# Patient Record
Sex: FEMALE | Race: WHITE | Employment: STUDENT | URBAN - METROPOLITAN AREA
[De-identification: names, ages, dates, MRNs, and addresses within clinical notes are randomized per-mention and may not be internally consistent; named-entity substitution may affect disease eponyms.]

---
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
## 2024-01-09 ENCOUNTER — TELEPHONE (OUTPATIENT)
Dept: INTERNAL MEDICINE CLINIC | Facility: CLINIC | Age: 19
End: 2024-01-09

## 2024-02-14 ENCOUNTER — OFFICE VISIT (OUTPATIENT)
Dept: INTERNAL MEDICINE CLINIC | Facility: CLINIC | Age: 19
End: 2024-02-14
Payer: COMMERCIAL

## 2024-02-14 VITALS
HEART RATE: 86 BPM | OXYGEN SATURATION: 98 % | HEIGHT: 68.11 IN | WEIGHT: 251.81 LBS | TEMPERATURE: 97 F | SYSTOLIC BLOOD PRESSURE: 138 MMHG | BODY MASS INDEX: 38.16 KG/M2 | DIASTOLIC BLOOD PRESSURE: 88 MMHG | RESPIRATION RATE: 18 BRPM

## 2024-02-14 DIAGNOSIS — K11.20 SALIVARY GLAND INFECTION: Primary | ICD-10-CM

## 2024-02-14 PROCEDURE — 3075F SYST BP GE 130 - 139MM HG: CPT | Performed by: FAMILY MEDICINE

## 2024-02-14 PROCEDURE — 3079F DIAST BP 80-89 MM HG: CPT | Performed by: FAMILY MEDICINE

## 2024-02-14 PROCEDURE — 99213 OFFICE O/P EST LOW 20 MIN: CPT | Performed by: FAMILY MEDICINE

## 2024-02-14 PROCEDURE — 3008F BODY MASS INDEX DOCD: CPT | Performed by: FAMILY MEDICINE

## 2024-02-14 RX ORDER — HYDROXYZINE HYDROCHLORIDE 25 MG/1
25 TABLET, FILM COATED ORAL DAILY PRN
COMMUNITY

## 2024-02-14 RX ORDER — AMOXICILLIN AND CLAVULANATE POTASSIUM 875; 125 MG/1; MG/1
1 TABLET, FILM COATED ORAL 2 TIMES DAILY
Qty: 20 TABLET | Refills: 0 | Status: SHIPPED | OUTPATIENT
Start: 2024-02-14 | End: 2024-02-24

## 2024-02-14 RX ORDER — ESCITALOPRAM OXALATE 20 MG/1
20 TABLET ORAL DAILY
COMMUNITY
Start: 2024-01-02

## 2024-02-14 NOTE — PROGRESS NOTES
CHIEF COMPLAINT:     Chief Complaint   Patient presents with    Mass     States appeared  2 days ago, has hard mass which has gone down. L side        HPI:   Maddie Graves is a 18 year old female .  Pt c/o left sided facial swelling. Pt was sent home from college because they thought it was the mumps. Pt was tested and has not gotten the results yet.  Pt states the swelling has gone down some and it is a little less tender. Pt thinks it is more of a salivary gland issue.  Current Outpatient Medications   Medication Sig Dispense Refill    escitalopram 20 MG Oral Tab Take 1 tablet (20 mg total) by mouth daily.      hydrOXYzine 25 MG Oral Tab Take 1 tablet (25 mg total) by mouth daily as needed for Itching.      amoxicillin clavulanate 875-125 MG Oral Tab Take 1 tablet by mouth 2 (two) times daily for 10 days. 20 tablet 0    Norethin-Eth Estrad-Fe Biphas (LO LOESTRIN FE OR) Take by mouth.        Past Medical History:   Diagnosis Date    Allergic rhinitis due to pollen     Asthma     Mild intermittent, flares about once yearly    UTI   2010      Social History:  Social History     Socioeconomic History    Marital status: Single   Tobacco Use    Smoking status: Never    Smokeless tobacco: Never   Vaping Use    Vaping Use: Never used   Substance and Sexual Activity    Alcohol use: No    Drug use: No   Other Topics Concern    Caffeine Concern Yes     Comment: sometimes soda, very rare.    Exercise Yes     Comment: 5x/week.        REVIEW OF SYSTEMS:   GENERAL: Denies fever, chills,weight change, decreased appetite  SKIN: Denies rashes, skin wounds or ulcers.  EYES: Denies blurred vision or double vision  HENT: Denies congestion, rhinorrhea, sore throat or ear pain  CHEST: Denies chest pain, or palpitations  LUNGS: Denies shortness of breath, cough, or wheezing  GI: Denies abdominal pain, N/V/C/D.   MUSCULOSKELETAL: no arthralgia or swollen joints  LYMPH:  see HPI  NEURO: Denies headaches or lightheadedness      EXAM:    /88   Pulse 86   Temp 97.1 °F (36.2 °C) (Temporal)   Resp 18   Ht 5' 8.11\" (1.73 m)   Wt 251 lb 12.8 oz (114.2 kg)   LMP 01/17/2024   SpO2 98%   BMI 38.16 kg/m²   GENERAL: well developed, well nourished,in no apparent distress  SKIN: no rashes,no suspicious lesions  HEAD: atraumatic, normocephalic  EYES: conjunctiva clear, EOM intact, PERRLA  EARS: TM's clear, no bulging, no retraction, no fluid  NOSE: nostrils patent, no exudates, nasal mucosa pink and noninflamed  THROAT: oral mucosa pink, moist. No visible dental caries. Posterior pharynx is no erythematous. no exudates.  Swollen salivary gland on the left side only, tender to palpate.  Dr. Ashraf consulted and examined Pt.  NECK: supple, non-tender  LUNGS: clear to auscultation bilaterally, no wheezes or rhonchi. Breathing is non labored.  CARDIO: RRR without murmur    Physical Exam    ASSESSMENT AND PLAN:     Encounter Diagnosis   Name Primary?    Salivary gland infection Yes       No orders of the defined types were placed in this encounter.      Meds & Refills for this Visit:  Requested Prescriptions     Signed Prescriptions Disp Refills    amoxicillin clavulanate 875-125 MG Oral Tab 20 tablet 0     Sig: Take 1 tablet by mouth 2 (two) times daily for 10 days.       Imaging & Consults:  None    PLAN:  - Start antibiotic, take with food  - tylenol/motrin as needed for discomfort  - massage the gland  - suck on emily or lemon drops to increase saliva flow  - drink plenty of fluids  - moist heat to help swelling and pain  The patient indicates understanding of these issues and agrees to the plan.  The patient is asked to call if not improving. Pt will also call with mumps result the college took.

## 2024-06-08 ENCOUNTER — LAB ENCOUNTER (OUTPATIENT)
Dept: LAB | Age: 19
End: 2024-06-08
Attending: FAMILY MEDICINE
Payer: COMMERCIAL

## 2024-06-08 ENCOUNTER — OFFICE VISIT (OUTPATIENT)
Dept: INTERNAL MEDICINE CLINIC | Facility: CLINIC | Age: 19
End: 2024-06-08
Payer: COMMERCIAL

## 2024-06-08 VITALS
OXYGEN SATURATION: 97 % | TEMPERATURE: 98 F | WEIGHT: 250 LBS | SYSTOLIC BLOOD PRESSURE: 115 MMHG | DIASTOLIC BLOOD PRESSURE: 78 MMHG | BODY MASS INDEX: 37.89 KG/M2 | HEART RATE: 92 BPM | HEIGHT: 68.11 IN

## 2024-06-08 DIAGNOSIS — R10.30 LOWER ABDOMINAL PAIN: ICD-10-CM

## 2024-06-08 DIAGNOSIS — N92.6 IRREGULAR MENSES: ICD-10-CM

## 2024-06-08 DIAGNOSIS — N92.6 IRREGULAR MENSES: Primary | ICD-10-CM

## 2024-06-08 LAB
ALBUMIN SERPL-MCNC: 4.4 G/DL (ref 3.2–4.8)
ALBUMIN/GLOB SERPL: 1.3 {RATIO} (ref 1–2)
ALP LIVER SERPL-CCNC: 58 U/L
ALT SERPL-CCNC: 35 U/L
ANION GAP SERPL CALC-SCNC: 8 MMOL/L (ref 0–18)
AST SERPL-CCNC: 26 U/L (ref ?–34)
BASOPHILS # BLD AUTO: 0.08 X10(3) UL (ref 0–0.2)
BASOPHILS NFR BLD AUTO: 0.9 %
BILIRUB SERPL-MCNC: 0.8 MG/DL (ref 0.3–1.2)
BUN BLD-MCNC: 10 MG/DL (ref 9–23)
BUN/CREAT SERPL: 13.2 (ref 10–20)
CALCIUM BLD-MCNC: 9.3 MG/DL (ref 8.7–10.4)
CHLORIDE SERPL-SCNC: 107 MMOL/L (ref 98–112)
CO2 SERPL-SCNC: 25 MMOL/L (ref 21–32)
CREAT BLD-MCNC: 0.76 MG/DL
DEPRECATED RDW RBC AUTO: 38.9 FL (ref 35.1–46.3)
DHEA-S SERPL-MCNC: 127.8 UG/DL
EGFRCR SERPLBLD CKD-EPI 2021: 115 ML/MIN/1.73M2 (ref 60–?)
EOSINOPHIL # BLD AUTO: 0.57 X10(3) UL (ref 0–0.7)
EOSINOPHIL NFR BLD AUTO: 6.3 %
ERYTHROCYTE [DISTWIDTH] IN BLOOD BY AUTOMATED COUNT: 11.8 % (ref 11–15)
FASTING STATUS PATIENT QL REPORTED: YES
FSH SERPL-ACNC: 5.4 MIU/ML
GLOBULIN PLAS-MCNC: 3.4 G/DL (ref 2–3.5)
GLUCOSE BLD-MCNC: 80 MG/DL (ref 70–99)
HCT VFR BLD AUTO: 41.5 %
HGB BLD-MCNC: 14.2 G/DL
IMM GRANULOCYTES # BLD AUTO: 0.03 X10(3) UL (ref 0–1)
IMM GRANULOCYTES NFR BLD: 0.3 %
LH SERPL-ACNC: 2.3 MIU/ML
LYMPHOCYTES # BLD AUTO: 2.12 X10(3) UL (ref 1–4)
LYMPHOCYTES NFR BLD AUTO: 23.3 %
MCH RBC QN AUTO: 31.1 PG (ref 26–34)
MCHC RBC AUTO-ENTMCNC: 34.2 G/DL (ref 31–37)
MCV RBC AUTO: 90.8 FL
MONOCYTES # BLD AUTO: 0.83 X10(3) UL (ref 0.1–1)
MONOCYTES NFR BLD AUTO: 9.1 %
NEUTROPHILS # BLD AUTO: 5.48 X10 (3) UL (ref 1.5–7.7)
NEUTROPHILS # BLD AUTO: 5.48 X10(3) UL (ref 1.5–7.7)
NEUTROPHILS NFR BLD AUTO: 60.1 %
OSMOLALITY SERPL CALC.SUM OF ELEC: 288 MOSM/KG (ref 275–295)
PLATELET # BLD AUTO: 379 10(3)UL (ref 150–450)
POTASSIUM SERPL-SCNC: 4.2 MMOL/L (ref 3.5–5.1)
PROT SERPL-MCNC: 7.8 G/DL (ref 5.7–8.2)
RBC # BLD AUTO: 4.57 X10(6)UL
SODIUM SERPL-SCNC: 140 MMOL/L (ref 136–145)
TSI SER-ACNC: 1.81 MIU/ML (ref 0.55–4.78)
WBC # BLD AUTO: 9.1 X10(3) UL (ref 4–11)

## 2024-06-08 PROCEDURE — 83002 ASSAY OF GONADOTROPIN (LH): CPT | Performed by: FAMILY MEDICINE

## 2024-06-08 PROCEDURE — 3078F DIAST BP <80 MM HG: CPT | Performed by: FAMILY MEDICINE

## 2024-06-08 PROCEDURE — 3074F SYST BP LT 130 MM HG: CPT | Performed by: FAMILY MEDICINE

## 2024-06-08 PROCEDURE — 82627 DEHYDROEPIANDROSTERONE: CPT | Performed by: FAMILY MEDICINE

## 2024-06-08 PROCEDURE — 80050 GENERAL HEALTH PANEL: CPT | Performed by: FAMILY MEDICINE

## 2024-06-08 PROCEDURE — 84143 ASSAY OF 17-HYDROXYPREGNENO: CPT | Performed by: FAMILY MEDICINE

## 2024-06-08 PROCEDURE — 3008F BODY MASS INDEX DOCD: CPT | Performed by: FAMILY MEDICINE

## 2024-06-08 PROCEDURE — 83001 ASSAY OF GONADOTROPIN (FSH): CPT | Performed by: FAMILY MEDICINE

## 2024-06-08 PROCEDURE — 99214 OFFICE O/P EST MOD 30 MIN: CPT | Performed by: FAMILY MEDICINE

## 2024-06-08 RX ORDER — NORETHINDRONE ACETATE AND ETHINYL ESTRADIOL, AND FERROUS FUMARATE 1MG-20(24)
KIT ORAL
COMMUNITY
Start: 2024-05-10

## 2024-06-08 RX ORDER — BETAMETHASONE DIPROPIONATE 0.5 MG/G
CREAM TOPICAL
COMMUNITY
Start: 2024-04-12

## 2024-06-08 NOTE — PROGRESS NOTES
Maddie Graves is a 18 year old female.  HPI:   For many yrs has had issues w irregular menses    this has gotten worse     has random vomiting now , not assoc w cycles   this has been since Dec , went away and then came back in April or May      has had lower abd pain off on as well     not necessarily w cycles        Few months ago had 1 month of nausea    resolved       has had issues w sleep off on as well      Has internal heat sensation in her body         Bms are daily to every other day    no blood or dark stools   Urine can have some urges     Sees gyne for OCP     appettie can fluctuate at times     eating does not cause issues   At present feels fine       No unusual HAs       Current Outpatient Medications   Medication Sig Dispense Refill    betamethasone dipropionate 0.05 % External Cream       escitalopram 20 MG Oral Tab Take 1 tablet (20 mg total) by mouth daily.      hydrOXYzine 25 MG Oral Tab Take 1 tablet (25 mg total) by mouth daily as needed for Itching.      Norethin-Eth Estrad-Fe Biphas (LO LOESTRIN FE OR) Take by mouth.      Norethin Ace-Eth Estrad-FE 1-20 MG-MCG(24) Oral Cap  (Patient not taking: Reported on 6/8/2024)        Past Medical History:    Allergic rhinitis due to pollen    Asthma    Mild intermittent, flares about once yearly    UTI        Social History:  Social History     Socioeconomic History    Marital status: Single   Tobacco Use    Smoking status: Never    Smokeless tobacco: Never   Vaping Use    Vaping status: Never Used   Substance and Sexual Activity    Alcohol use: No    Drug use: No   Other Topics Concern    Caffeine Concern Yes     Comment: sometimes soda, very rare.    Exercise Yes     Comment: 5x/week.        REVIEW OF SYSTEMS:   GENERAL HEALTH: feels well otherwise  SKIN: denies rashes  RESPIRATORY: denies shortness of breath  CARDIOVASCULAR: denies chest pain   GI: denies abdominal pain  NEURO: denies headaches    EXAM:   /78   Pulse 92   Temp 98 °F  (36.7 °C) (Temporal)   Ht 5' 8.11\" (1.73 m)   Wt 250 lb (113.4 kg)   LMP 06/01/2024   SpO2 97%   BMI 37.89 kg/m²   GENERAL: well developed, well nourished,in no apparent distress  SKIN: no rashes  NECK: supple,no adenopathy  LUNGS: clear to auscultation  CARDIO: RRR without murmur  ABD:  soft NT  no mass or HSM    EXTREMITIES: no edema    ASSESSMENT AND PLAN:   Irregular menses/lower abd pain/nausea :  Glad she feels better    Has appt w gyne in aug and states they will order US then      discussed her s/s    the nausea is better    BMs are fine     Alreaady on OCP    will check labs, US pelvis   Proceed then    The patient indicates understanding of these issues and agrees to the plan.  .

## 2024-06-13 LAB — 17-OH PROGESTERONE: 24 NG/DL

## 2024-11-21 ENCOUNTER — OFFICE VISIT (OUTPATIENT)
Dept: INTERNAL MEDICINE CLINIC | Facility: CLINIC | Age: 19
End: 2024-11-21
Payer: COMMERCIAL

## 2024-11-21 VITALS
HEART RATE: 88 BPM | DIASTOLIC BLOOD PRESSURE: 88 MMHG | OXYGEN SATURATION: 96 % | TEMPERATURE: 97 F | WEIGHT: 253.63 LBS | RESPIRATION RATE: 18 BRPM | SYSTOLIC BLOOD PRESSURE: 132 MMHG | BODY MASS INDEX: 38.44 KG/M2 | HEIGHT: 68.11 IN

## 2024-11-21 DIAGNOSIS — R42 DIZZINESS: Primary | ICD-10-CM

## 2024-11-21 DIAGNOSIS — R26.89 BALANCE DISORDER: ICD-10-CM

## 2024-11-21 DIAGNOSIS — R51.9 GENERALIZED HEADACHES: ICD-10-CM

## 2024-11-21 PROCEDURE — 3008F BODY MASS INDEX DOCD: CPT | Performed by: FAMILY MEDICINE

## 2024-11-21 PROCEDURE — 99214 OFFICE O/P EST MOD 30 MIN: CPT | Performed by: FAMILY MEDICINE

## 2024-11-21 PROCEDURE — 3079F DIAST BP 80-89 MM HG: CPT | Performed by: FAMILY MEDICINE

## 2024-11-21 PROCEDURE — 3075F SYST BP GE 130 - 139MM HG: CPT | Performed by: FAMILY MEDICINE

## 2024-11-21 RX ORDER — DROSPIRENONE AND ESTETROL 3-14.2(28)
1 KIT ORAL DAILY
COMMUNITY
Start: 2024-11-05

## 2024-11-21 RX ORDER — VENLAFAXINE HYDROCHLORIDE 37.5 MG/1
75 CAPSULE, EXTENDED RELEASE ORAL DAILY
COMMUNITY
Start: 2024-11-11

## 2024-11-21 RX ORDER — MECLIZINE HYDROCHLORIDE 25 MG/1
1 TABLET ORAL 3 TIMES DAILY PRN
COMMUNITY
Start: 2024-11-20 | End: 2024-11-27

## 2024-11-21 NOTE — PROGRESS NOTES
CHIEF COMPLAINT:     Chief Complaint   Patient presents with    ER F/U     States it is not as bad, but not getting better. Pt dad states pt was nodding out and not being coherent in the waiting room       HPI:   Maddie Graves is a 19 year old female .  The patient presents for a recheck after ER visit for complaints of Dizziness. Was seen 2 days ago.   History at ER by her college:   Patient is a 19-year-old female with no significant past medical history who presents to the ED for evaluation of dizziness. Symptoms started earlier today. Patient states she feels off balance at times when she tries to walk, also felt shaky earlier. Patient also states she feels bloated, states she was eating and dining cline and became full really quickly. She also reports throat tightness and chest tightness. Patient states she was having these symptoms earlier today and when she 1st arrived to the ED but they have now resolved and currently feels normal except just slightly off balance when she tries to walk.   Dizziness  Associated symptoms: no chest pain, no headaches, no nausea, no shortness of breath and no vomiting   .   The following tests were done: labs, chest xray and EKG which were all negative. Pt is on the following meds: Antivert. The patient is feeling a little better but still does not feel right. Pt states she still feels off balance and lightheaded at times. Pt denies ear issues or sinus issues. 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   Current symptoms started Tuesday. Pt states she has been stressed and has hx of anxiety/depression but this did not feel like one of her panic attacks. Pt is concern about how she spaces out and can not get her eyes to focus at times. Pt has also been having headaches that are at her temples and it is a stinging pain at times. Pt denies any  falls or head injuries. Pt has a tendency to lean left, occ shakes and muscles feel like they twitch. Pt wonders if she is having seizures.      Current Outpatient Medications   Medication Sig Dispense Refill    venlafaxine ER 37.5 MG Oral Capsule SR 24 Hr Take 2 capsules (75 mg total) by mouth daily.      NEXTSTELLIS 3-14.2 MG Oral Tab Take 1 tablet by mouth daily.      meclizine 25 MG Oral Tab Take 1 tablet (25 mg total) by mouth 3 (three) times daily as needed.      betamethasone dipropionate 0.05 % External Cream       hydrOXYzine 25 MG Oral Tab Take 1 tablet (25 mg total) by mouth daily as needed for Itching.        Past Medical History:    Allergic rhinitis due to pollen    Asthma    Mild intermittent, flares about once yearly    UTI        Social History:  Social History     Socioeconomic History    Marital status: Single   Tobacco Use    Smoking status: Never    Smokeless tobacco: Never   Vaping Use    Vaping status: Never Used   Substance and Sexual Activity    Alcohol use: No    Drug use: No   Other Topics Concern    Caffeine Concern Yes     Comment: sometimes soda, very rare.    Exercise Yes     Comment: 5x/week.        REVIEW OF SYSTEMS:   GENERAL: Denies fever, chills,weight change, decreased appetite  SKIN: Denies rashes, skin wounds or ulcers.  EYES: Denies blurred vision or double vision  HENT: Denies congestion, rhinorrhea, sore throat or ear pain  CHEST: Denies chest pain, or palpitations  LUNGS: Denies shortness of breath, cough, or wheezing  GI: Denies abdominal pain, N/V/C/D.   MUSCULOSKELETAL: no arthralgia or swollen joints  LYMPH:  Denies lymphadenopathy  NEURO: see HPI      EXAM:   /88   Pulse 88   Temp 97.3 °F (36.3 °C) (Temporal)   Resp 18   Ht 5' 8.11\" (1.73 m)   Wt 253 lb 9.6 oz (115 kg)   LMP 08/24/2024   SpO2 96%   BMI 38.44 kg/m²   GENERAL: well developed, well nourished,in no apparent distress  SKIN: no rashes,no suspicious lesions  HEAD: atraumatic,  normocephalic  EYES: conjunctiva clear, EOM intact, PERRLA  EARS: TM's clear, no bulging, no retraction, no fluid  NOSE: nostrils patent, no exudates, nasal mucosa pink and noninflamed  THROAT: oral mucosa pink, moist. No visible dental caries. Posterior pharynx is no erythematous. no exudates.  NECK: supple, non-tender, no thyromegaly  LUNGS: clear to auscultation bilaterally, no wheezes or rhonchi. Breathing is non labored.  CARDIO: RRR without murmur  EXTREMITIES: no cyanosis edema  LYMPH:  no lymphadenopathy.    NEURO: Alert & Oriented x 3.  CN II-XII intact. Moving all 4 extremities without difficulty.Gait and station normal.   No facial droop.  No slurred speech.  Patella & Brachioradialis DTR intact bilaterally. Muscle tone and strength normal and symmetric. Motor and sensation grossly normal. Romberg: negative  PSYCH:  Speech, mood and affect are appropriate    Physical Exam    ASSESSMENT AND PLAN:     Encounter Diagnoses   Name Primary?    Balance disorder     Generalized headaches     Dizziness Yes       No orders of the defined types were placed in this encounter.      Meds & Refills for this Visit:  Requested Prescriptions      No prescriptions requested or ordered in this encounter       Imaging & Consults:  CT BRAIN OR HEAD (CPT=70450) STAT ordered. Unable to get into [facility] until 12/6/24. Ramon can get her in tomorrow at 3:30 pm. Pt took appt at 3:30 pm tomorrow.    PLAN:    Pt advised to continue the Antivert and also take an antihistamine like zyrtec, allegra or claritin for her ears.   If pt's symptoms worsen she is to go to the ER to be assessed.  Consider Neuro consult once CT is back.  The patient indicates understanding of these issues and agrees to the plan.  The patient is asked to call with any concerns.      I spent 30 minutes preparing to see the patient,reviewing patient's chart,results,history,medical decision making,placing orders,counseling/coordinating care and documenting in the  chart.

## 2024-11-22 ENCOUNTER — HOSPITAL ENCOUNTER (OUTPATIENT)
Dept: CT IMAGING | Facility: HOSPITAL | Age: 19
Discharge: HOME | End: 2024-11-22
Attending: FAMILY MEDICINE
Payer: COMMERCIAL

## 2024-11-22 DIAGNOSIS — R26.89 BALANCE DISORDER: ICD-10-CM

## 2024-11-22 DIAGNOSIS — R51.9 GENERALIZED HEADACHES: ICD-10-CM

## 2024-11-22 PROCEDURE — 70450 CT HEAD/BRAIN W/O DYE: CPT | Performed by: FAMILY MEDICINE

## (undated) NOTE — LETTER
08/09/18        Maddie JEREZ [address] [facility]      Dear Breanna García,    Our records indicate that you have outstanding lab work and or testing that was ordered for you and has not yet been completed:          CBC W Differential W Abby

## (undated) NOTE — LETTER
[facility] [employer] of Child Health Examination       Student's Name  Shelton Aguilar Title                           Date     Signature HEALTH HISTORY          TO BE COMPLETED AND SIGNED BY PARENT/GUARDIAN AND VERIFIED BY HEALTH CARE PROVIDER    ALLERGIES  (Food, drug, insect, other)  Pollen; Seasonal; Latex MEDICATION  (List all prescribed or taken on a regular basis.)    Current Outpatie PHYSICAL EXAMINATION REQUIREMENTS    Entire section below to be completed by MD//APN/PA       PHYSICAL EXAMINATION REQUIREMENTS (head circumference if <33 years old):   BP 94/68   Pulse 80   Temp 98.6 °F (37 °C) (Oral)   Resp 12   Ht 67\"   Wt 225 lb 4 Nose Yes  Neurological Yes    Throat Yes  Musculoskeletal Yes    Mouth/Dental Yes  Spinal examination Yes    Cardiovascular/HTN Yes  Nutritional status Yes    Respiratory Yes                   Diagnosis of Asthma: No Mental Health Yes        Currently Pres

## (undated) NOTE — ED AVS SNAPSHOT
Parent/Legal Guardian Access to the Online [email or web address] Record of a Patient 15to 16Years Old  Return completed form by Secure email to [facility] HIM/Medical Records Department: [email or web address]     Requirements and Procedures   Under [facility] MyChart ID and password with another person, that person may be able to view my or my child’s health information, and health information about someone who has authorized me as a MyChart proxy.    ·  I agree that it is my responsibility to select a confident Sign-Up Form and I agree to its terms.        Authorization Form     Please enter Patient’s information below:   Name (last, first, middle initial) __________________________________________   Gender  Male  Female    Last 4 Digits of Social Security Number Parent/Legal Guardian Signature                                  For Patient (1517 years of age)  I agree to allow my parent/legal guardian, named above, online access to my medical information currently available and that may become available as a result

## (undated) NOTE — LETTER
05/27/21        Maddie Graves  [address]  [place] [address]      Dear Geetha Poole,    Our records indicate that you have outstanding lab work and or testing that was ordered for you and has not yet been completed:  Orders Placed This Encounte

## (undated) NOTE — LETTER
08/15/19        Maddie JEREZ [address] [facility]      Dear Geetha Poole,    Our records indicate that you have outstanding lab work and or testing that was ordered for you and has not yet been completed: Fasting lab work   *fast for at l